# Patient Record
Sex: FEMALE | Race: WHITE | Employment: OTHER | URBAN - METROPOLITAN AREA
[De-identification: names, ages, dates, MRNs, and addresses within clinical notes are randomized per-mention and may not be internally consistent; named-entity substitution may affect disease eponyms.]

---

## 2017-01-04 PROBLEM — M54.2 CHRONIC NECK PAIN: Status: ACTIVE | Noted: 2017-01-04

## 2017-01-04 PROBLEM — G89.29 CHRONIC NECK PAIN: Status: ACTIVE | Noted: 2017-01-04

## 2018-08-28 ENCOUNTER — HOSPITAL ENCOUNTER (OUTPATIENT)
Dept: INTERVENTIONAL RADIOLOGY/VASCULAR | Age: 63
Discharge: HOME | End: 2018-08-30
Payer: COMMERCIAL

## 2018-08-28 DIAGNOSIS — I99.8 ISCHEMIA: ICD-10-CM

## 2018-08-28 PROCEDURE — 93923 UPR/LXTR ART STDY 3+ LVLS: CPT

## 2019-02-19 ENCOUNTER — HOSPITAL ENCOUNTER (OUTPATIENT)
Dept: GENERAL RADIOLOGY | Age: 64
Discharge: HOME | End: 2019-02-21
Payer: COMMERCIAL

## 2019-02-19 ENCOUNTER — HOSPITAL ENCOUNTER (OUTPATIENT)
Age: 64
Discharge: HOME | End: 2019-02-21
Payer: COMMERCIAL

## 2019-02-19 DIAGNOSIS — G89.29 CHRONIC LEFT-SIDED LOW BACK PAIN WITH SCIATICA, SCIATICA LATERALITY UNSPECIFIED: ICD-10-CM

## 2019-02-19 DIAGNOSIS — G89.29 CHRONIC BILATERAL LOW BACK PAIN WITHOUT SCIATICA: ICD-10-CM

## 2019-02-19 DIAGNOSIS — M54.50 CHRONIC BILATERAL LOW BACK PAIN WITHOUT SCIATICA: ICD-10-CM

## 2019-02-19 DIAGNOSIS — M54.40 CHRONIC LEFT-SIDED LOW BACK PAIN WITH SCIATICA, SCIATICA LATERALITY UNSPECIFIED: ICD-10-CM

## 2019-02-19 PROCEDURE — 72100 X-RAY EXAM L-S SPINE 2/3 VWS: CPT

## 2019-02-19 PROCEDURE — 72040 X-RAY EXAM NECK SPINE 2-3 VW: CPT

## 2019-02-19 PROCEDURE — 73030 X-RAY EXAM OF SHOULDER: CPT

## 2019-02-19 PROCEDURE — 72070 X-RAY EXAM THORAC SPINE 2VWS: CPT

## 2019-02-22 ENCOUNTER — OFFICE VISIT (OUTPATIENT)
Dept: PHYSICAL MEDICINE AND REHAB | Age: 64
End: 2019-02-22
Payer: COMMERCIAL

## 2019-02-22 VITALS — HEIGHT: 65 IN | BODY MASS INDEX: 18.33 KG/M2 | WEIGHT: 110 LBS

## 2019-02-22 DIAGNOSIS — R20.0 NUMBNESS OF RIGHT FOOT: ICD-10-CM

## 2019-02-22 DIAGNOSIS — M54.16 LUMBAR BACK PAIN WITH RADICULOPATHY AFFECTING RIGHT LOWER EXTREMITY: Primary | ICD-10-CM

## 2019-02-22 PROCEDURE — 95886 MUSC TEST DONE W/N TEST COMP: CPT | Performed by: PHYSICAL MEDICINE & REHABILITATION

## 2019-02-22 PROCEDURE — G8484 FLU IMMUNIZE NO ADMIN: HCPCS | Performed by: PHYSICAL MEDICINE & REHABILITATION

## 2019-02-22 PROCEDURE — 3017F COLORECTAL CA SCREEN DOC REV: CPT | Performed by: PHYSICAL MEDICINE & REHABILITATION

## 2019-02-22 PROCEDURE — G8427 DOCREV CUR MEDS BY ELIG CLIN: HCPCS | Performed by: PHYSICAL MEDICINE & REHABILITATION

## 2019-02-22 PROCEDURE — 99202 OFFICE O/P NEW SF 15 MIN: CPT | Performed by: PHYSICAL MEDICINE & REHABILITATION

## 2019-02-22 PROCEDURE — G8419 CALC BMI OUT NRM PARAM NOF/U: HCPCS | Performed by: PHYSICAL MEDICINE & REHABILITATION

## 2019-02-22 PROCEDURE — 95909 NRV CNDJ TST 5-6 STUDIES: CPT | Performed by: PHYSICAL MEDICINE & REHABILITATION

## 2019-02-22 PROCEDURE — 1036F TOBACCO NON-USER: CPT | Performed by: PHYSICAL MEDICINE & REHABILITATION

## 2019-02-25 DIAGNOSIS — M54.16 LUMBAR BACK PAIN WITH RADICULOPATHY AFFECTING RIGHT LOWER EXTREMITY: ICD-10-CM

## 2019-02-25 DIAGNOSIS — R20.0 NUMBNESS OF RIGHT FOOT: ICD-10-CM

## 2019-11-29 ENCOUNTER — HOSPITAL ENCOUNTER (OUTPATIENT)
Age: 64
Discharge: HOME | End: 2019-11-29
Payer: COMMERCIAL

## 2019-11-29 ENCOUNTER — HOSPITAL ENCOUNTER (OUTPATIENT)
Dept: MAMMOGRAPHY | Age: 64
Discharge: HOME | End: 2019-12-01
Payer: COMMERCIAL

## 2019-11-29 DIAGNOSIS — E78.2 MODERATE MIXED HYPERLIPIDEMIA NOT REQUIRING STATIN THERAPY: ICD-10-CM

## 2019-11-29 DIAGNOSIS — D50.8 IRON DEFICIENCY ANEMIA SECONDARY TO INADEQUATE DIETARY IRON INTAKE: ICD-10-CM

## 2019-11-29 DIAGNOSIS — E11.9 TYPE 2 DIABETES MELLITUS WITHOUT COMPLICATION, WITHOUT LONG-TERM CURRENT USE OF INSULIN (HCC): ICD-10-CM

## 2019-11-29 DIAGNOSIS — E03.8 HYPOTHYROIDISM DUE TO HASHIMOTO'S THYROIDITIS: ICD-10-CM

## 2019-11-29 DIAGNOSIS — Z12.31 SCREENING MAMMOGRAM, ENCOUNTER FOR: ICD-10-CM

## 2019-11-29 DIAGNOSIS — E55.9 VITAMIN D DEFICIENCY: ICD-10-CM

## 2019-11-29 DIAGNOSIS — E06.3 HYPOTHYROIDISM DUE TO HASHIMOTO'S THYROIDITIS: ICD-10-CM

## 2019-11-29 LAB
ALBUMIN SERPL-MCNC: 5 G/DL (ref 3.5–5.2)
ALP BLD-CCNC: 76 U/L (ref 35–104)
ALT SERPL-CCNC: 9 U/L (ref 0–32)
ANION GAP SERPL CALCULATED.3IONS-SCNC: 14 MMOL/L (ref 7–16)
AST SERPL-CCNC: 13 U/L (ref 0–31)
BASOPHILS ABSOLUTE: 0.04 E9/L (ref 0–0.2)
BASOPHILS RELATIVE PERCENT: 0.9 % (ref 0–2)
BILIRUB SERPL-MCNC: 0.4 MG/DL (ref 0–1.2)
BUN BLDV-MCNC: 11 MG/DL (ref 8–23)
CALCIUM SERPL-MCNC: 9.7 MG/DL (ref 8.6–10.2)
CHLORIDE BLD-SCNC: 101 MMOL/L (ref 98–107)
CHOLESTEROL, TOTAL: 254 MG/DL (ref 0–199)
CO2: 25 MMOL/L (ref 22–29)
CREAT SERPL-MCNC: 0.8 MG/DL (ref 0.5–1)
EOSINOPHILS ABSOLUTE: 0.13 E9/L (ref 0.05–0.5)
EOSINOPHILS RELATIVE PERCENT: 3 % (ref 0–6)
FERRITIN: 176 NG/ML
GFR AFRICAN AMERICAN: >60
GFR NON-AFRICAN AMERICAN: >60 ML/MIN/1.73
GLUCOSE BLD-MCNC: 99 MG/DL (ref 74–99)
HBA1C MFR BLD: 5.1 % (ref 4–5.6)
HCT VFR BLD CALC: 49.6 % (ref 34–48)
HDLC SERPL-MCNC: 64 MG/DL
HEMOGLOBIN: 16 G/DL (ref 11.5–15.5)
IMMATURE GRANULOCYTES #: 0.01 E9/L
IMMATURE GRANULOCYTES %: 0.2 % (ref 0–5)
IRON SATURATION: 44 % (ref 15–50)
IRON: 126 MCG/DL (ref 37–145)
LDL CHOLESTEROL CALCULATED: 165 MG/DL (ref 0–99)
LYMPHOCYTES ABSOLUTE: 2.38 E9/L (ref 1.5–4)
LYMPHOCYTES RELATIVE PERCENT: 55.3 % (ref 20–42)
MCH RBC QN AUTO: 30.2 PG (ref 26–35)
MCHC RBC AUTO-ENTMCNC: 32.3 % (ref 32–34.5)
MCV RBC AUTO: 93.6 FL (ref 80–99.9)
MONOCYTES ABSOLUTE: 0.36 E9/L (ref 0.1–0.95)
MONOCYTES RELATIVE PERCENT: 8.4 % (ref 2–12)
NEUTROPHILS ABSOLUTE: 1.38 E9/L (ref 1.8–7.3)
NEUTROPHILS RELATIVE PERCENT: 32.2 % (ref 43–80)
PDW BLD-RTO: 11.9 FL (ref 11.5–15)
PLATELET # BLD: 168 E9/L (ref 130–450)
PMV BLD AUTO: 10.1 FL (ref 7–12)
POTASSIUM SERPL-SCNC: 4.6 MMOL/L (ref 3.5–5)
RBC # BLD: 5.3 E12/L (ref 3.5–5.5)
SODIUM BLD-SCNC: 140 MMOL/L (ref 132–146)
TOTAL IRON BINDING CAPACITY: 288 MCG/DL (ref 250–450)
TOTAL PROTEIN: 8 G/DL (ref 6.4–8.3)
TRIGL SERPL-MCNC: 123 MG/DL (ref 0–149)
TSH SERPL DL<=0.05 MIU/L-ACNC: 2.93 UIU/ML (ref 0.27–4.2)
VITAMIN D 25-HYDROXY: 18 NG/ML (ref 30–100)
VLDLC SERPL CALC-MCNC: 25 MG/DL
WBC # BLD: 4.3 E9/L (ref 4.5–11.5)

## 2019-11-29 PROCEDURE — 85025 COMPLETE CBC W/AUTO DIFF WBC: CPT

## 2019-11-29 PROCEDURE — 83540 ASSAY OF IRON: CPT

## 2019-11-29 PROCEDURE — 83036 HEMOGLOBIN GLYCOSYLATED A1C: CPT

## 2019-11-29 PROCEDURE — 80053 COMPREHEN METABOLIC PANEL: CPT

## 2019-11-29 PROCEDURE — 77063 BREAST TOMOSYNTHESIS BI: CPT

## 2019-11-29 PROCEDURE — 80061 LIPID PANEL: CPT

## 2019-11-29 PROCEDURE — 36415 COLL VENOUS BLD VENIPUNCTURE: CPT

## 2019-11-29 PROCEDURE — 84443 ASSAY THYROID STIM HORMONE: CPT

## 2019-11-29 PROCEDURE — 82728 ASSAY OF FERRITIN: CPT

## 2019-11-29 PROCEDURE — 83550 IRON BINDING TEST: CPT

## 2019-11-29 PROCEDURE — 82306 VITAMIN D 25 HYDROXY: CPT

## 2020-11-02 ENCOUNTER — HOSPITAL ENCOUNTER (OUTPATIENT)
Dept: MRI IMAGING | Age: 65
Discharge: HOME | End: 2020-11-04
Payer: MEDICARE

## 2020-11-02 PROCEDURE — 72148 MRI LUMBAR SPINE W/O DYE: CPT

## 2020-11-02 PROCEDURE — 72141 MRI NECK SPINE W/O DYE: CPT

## 2021-02-16 ENCOUNTER — OFFICE VISIT (OUTPATIENT)
Dept: PAIN MANAGEMENT | Age: 66
End: 2021-02-16
Payer: MEDICARE

## 2021-02-16 VITALS
HEART RATE: 77 BPM | SYSTOLIC BLOOD PRESSURE: 100 MMHG | HEIGHT: 65 IN | OXYGEN SATURATION: 99 % | TEMPERATURE: 97.1 F | DIASTOLIC BLOOD PRESSURE: 70 MMHG | RESPIRATION RATE: 18 BRPM | WEIGHT: 127 LBS | BODY MASS INDEX: 21.16 KG/M2

## 2021-02-16 DIAGNOSIS — M47.816 LUMBAR FACET ARTHROPATHY: ICD-10-CM

## 2021-02-16 DIAGNOSIS — M47.816 LUMBAR SPONDYLOSIS: Primary | ICD-10-CM

## 2021-02-16 DIAGNOSIS — M47.812 CERVICAL SPONDYLOSIS: ICD-10-CM

## 2021-02-16 DIAGNOSIS — M51.9 LUMBAR DISC DISORDER: ICD-10-CM

## 2021-02-16 DIAGNOSIS — M47.812 CERVICAL FACET JOINT SYNDROME: ICD-10-CM

## 2021-02-16 DIAGNOSIS — G89.4 CHRONIC PAIN SYNDROME: ICD-10-CM

## 2021-02-16 PROCEDURE — G8427 DOCREV CUR MEDS BY ELIG CLIN: HCPCS | Performed by: PAIN MEDICINE

## 2021-02-16 PROCEDURE — 99204 OFFICE O/P NEW MOD 45 MIN: CPT | Performed by: PAIN MEDICINE

## 2021-02-16 PROCEDURE — 4040F PNEUMOC VAC/ADMIN/RCVD: CPT | Performed by: PAIN MEDICINE

## 2021-02-16 PROCEDURE — 1123F ACP DISCUSS/DSCN MKR DOCD: CPT | Performed by: PAIN MEDICINE

## 2021-02-16 PROCEDURE — 4004F PT TOBACCO SCREEN RCVD TLK: CPT | Performed by: PAIN MEDICINE

## 2021-02-16 PROCEDURE — 99214 OFFICE O/P EST MOD 30 MIN: CPT

## 2021-02-16 PROCEDURE — 1090F PRES/ABSN URINE INCON ASSESS: CPT | Performed by: PAIN MEDICINE

## 2021-02-16 PROCEDURE — G8484 FLU IMMUNIZE NO ADMIN: HCPCS | Performed by: PAIN MEDICINE

## 2021-02-16 PROCEDURE — G8420 CALC BMI NORM PARAMETERS: HCPCS | Performed by: PAIN MEDICINE

## 2021-02-16 PROCEDURE — 3017F COLORECTAL CA SCREEN DOC REV: CPT | Performed by: PAIN MEDICINE

## 2021-02-16 PROCEDURE — G8400 PT W/DXA NO RESULTS DOC: HCPCS | Performed by: PAIN MEDICINE

## 2021-02-16 RX ORDER — DIAZEPAM 10 MG/1
10 TABLET ORAL 3 TIMES DAILY
COMMUNITY

## 2021-02-16 RX ORDER — GABAPENTIN 300 MG/1
300 CAPSULE ORAL 3 TIMES DAILY
COMMUNITY

## 2021-02-16 RX ORDER — MULTIVIT WITH MINERALS/LUTEIN
1000 TABLET ORAL DAILY
COMMUNITY

## 2021-02-16 NOTE — PROGRESS NOTES
[facility]        [address], [address] [facility]      [phone]          Consult Note      Patient:  VANNESA Cazares 1955    Date of Service:  21    Requesting Physician:  Clyde Topete DO    Reason for Consult:      Patient presents with complaints of neck and low back pain that started a long time ago and has been stable. HISTORY OF PRESENT ILLNESS:      Pain does radiate to right shoulder. She  has numbness, tingling of the right shoulder and arm and does not have bladder or bowel dysfunction. She has not been on anticoagulation medications to include none. The patient  has not been on herbal supplements. The patient is not diabetic. Imaging:   Cervical spine MRI 2020:  Mild multilevel degenerative change with mild canal stenosis at C4-5    Lumbar spine MRI 2020:    1.  Minimal edematous degenerative endplate changes are seen along the right   aspect of superior endplate of L4.       2.  No fracture or malalignment.       3.  Mild right neural foraminal narrowing seen at L5/S1. Previous treatments: Physical Therapy, Nerve block, Epidural Steroid Injection and medications. .  Patient had seen  and Rambo Harrington in the past. Patient was on [address] [place] [address],[address] 10[address] TID    Opioid Agreement:  Date enacted:N/A  Renewal date:N/A    Past Medical History:   Diagnosis Date    Anxiety     Back pain     Bronchitis     Cough     Degenerative disc disease, cervical 2016    Fever chills     Headache     Neck pain     Raynaud's syndrome     Shoulder pain     Weakness - general     Wears glasses     Weight gain      Past Surgical History:   Procedure Laterality Date     SECTION  1985    FOOT SURGERY      right bunions   \  Prior to Admission medications    Medication Sig Start Date End Date Taking? Authorizing Provider   gabapentin (NEURONTIN) 300 MG capsule Take 300 mg by mouth 3 times daily.    Yes Historical Provider, MD   diazePAM (VALIUM) 10 MG tablet Take 10 mg by mouth 3 times daily. Yes Historical Provider, MD   Ascorbic Acid (VITAMIN C) 1000 MG tablet Take 1,000 mg by mouth daily   Yes Historical Provider, MD   Multiple Vitamin (MULTIVITAMIN ADULT PO) Take by mouth   Yes Historical Provider, MD   nicotine (NICODERM CQ) 21 MG/24HR Place 1 patch onto the skin daily 5/28/19 9/3/19  Pastora Gallardo MD   butalbital-acetaminophen-caffeine (FIORICET, [facility]) -01 MG per tablet Take 1 tablet by mouth every 6 hours as needed for Headaches 5/28/19 9/3/19  Pastora Gallardo MD   sertraline (ZOLOFT) 50 MG tablet Take 1 tablet by mouth daily  Patient taking differently: Take 150 mg by mouth daily  4/29/19   Pastora Gallardo MD     Allergies   Allergen Reactions    Penicillins Hives     Social History     Socioeconomic History    Marital status:      Spouse name: Not on file    Number of children: Not on file    Years of education: Not on file    Highest education level: Not on file   Occupational History    Not on file   Social Needs    Financial resource strain: Not on file    Food insecurity     Worry: Not on file     Inability: Not on file    Transportation needs     Medical: Not on file     Non-medical: Not on file   Tobacco Use    Smoking status: Current Every Day Smoker     Packs/day: 1.00     Years: 40.00     Pack years: 40.00    Smokeless tobacco: Never Used    Tobacco comment: she is on the patch   Substance and Sexual Activity    Alcohol use:  Yes     Alcohol/week: 1.0 standard drinks     Types: 1 Glasses of wine per week     Comment: 3 drinks per month whiskey    Drug use: Yes     Types: Marijuana     Comment: 2 times per day    Sexual activity: Not on file   Lifestyle    Physical activity     Days per week: Not on file     Minutes per session: Not on file    Stress: Not on file   Relationships    Social connections     Talks on phone: Not on file     Gets together: Not on file Attends Judaism service: Not on file     Active member of club or organization: Not on file     Attends meetings of clubs or organizations: Not on file     Relationship status: Not on file    Intimate partner violence     Fear of current or ex partner: Not on file     Emotionally abused: Not on file     Physically abused: Not on file     Forced sexual activity: Not on file   Other Topics Concern    Not on file   Social History Narrative    Not on file     Family History   Problem Relation Age of Onset    Diabetes Sister     Hypertension Sister     Diabetes Brother     Hypertension Brother      REVIEW OF SYSTEMS:     Patient specifically denies fever/chills, chest pain, shortness of breath, new bowel or bladder complaints. All other review of systems was negative. PHYSICAL EXAMINATION:      /70   Pulse 77   Temp 97.1 °F (36.2 °C)   Resp 18   Ht 5' 5\" (1.651 m)   Wt 127 lb (57.6 kg)   SpO2 99%   BMI 21.13 kg/m²     General:      General appearance:   pleasant and well-hydrated. , in mild discomfort and A & O x3  Build:Normal Weight    HEENT:    Head:normocephalic and atraumatic  Sclera: icterus absent,     Lungs:    Breathing:Breathing Pattern: regular, no distress    Abdomen:    Shape:non-distended and normal  Tenderness:none    Cervical spine:    Inspection:normal  Palpation:tenderness paravertebral muscles, facet loading, left, right, positive and tenderness. Range of motion:normal mildly flexion, extension rotation bilateral and is  painful. Lumbar spine:    Spine inspection:normal   CVA tenderness:No   Palpation:facet loading positive bilateral  Range of motion:normal not Lateral bending, flexion, extension rotation bilateral and is not painful.     Musculoskeletal:    Trigger points in Paraveteral:absent bilaterally  Krishnan's:negative right, negative left   SI joint tenderness:negative right, negative left              ROLAND test:negative right, negative left  Piriformis tenderness:negative right, negative left  Trochanteric bursa tenderness:negative right, negative left  SLR:negative right, negative left, sitting     Extremities:    Tremors:None bilaterally upper and lower  Range of motion:Generally normal shoulders, pain with internal rotation of hips negative. Intact:Yes  Edema:Normal    Neurological:    Sensory:normal to light touch bilateral upper and lower extremities. Motor:              Right Bicep5/5           Left Bicep5/5              Right Triceps5/5       Left Triceps5/5          Right Deltoid5/5     Left Deltoid5/5                  Right Quadriceps5/5          Left Quadriceps5/5           Right Gastrocnemius5/5    Left Gastrocnemius5/5  Right Ant Tibialis5/5  Left Ant Tibialis5/5  Reflexes:    Right Brachioradialis reflex2+  Left Brachioradialis reflex2+  Right Biceps reflex2+  Left Biceps reflex2+  Right Triceps reflex2+  Left Triceps reflex2+  Right Quadriceps reflex2+  Left Quadriceps reflex2+  Right Achilles reflex2+  Left Achilles reflex2+  Gait:normal    Dermatology:    Skin:no unusual rashes and no skin lesions    Impression:  Chronic neck and low back pain. Cervical and lumbar spine very mild degenerative changes at C5-6 and L4. Patient was a former patient of [facility] and had multiple interventional procedures which per patient didn't help. Patient was a former patient of  and was on [address] [facility] T[address]. Plan:  Chronic axial neck and low back pain. Reviewed cervical and lumbar spine MRI and discussed with the patient. Discussed treatment options with the patient including referral to physical therapy and possible facet MBB for her neck and low back pain. Advised patient that I cannot justify chronic opioid therapy. Cancel urine screen. OARRS report reviewed 02/2021. Patient encouraged to stay active. Treatment plan discussed with the patient.     We discussed with the patient that combining opioids, benzodiazepines, alcohol, illicit drugs or sleep aids increases the risk of respiratory depression including death. We discussed that these medications may cause drowsiness, sedation or dizziness and have counseled the patient not to drive or operate machinery. We have discussed that these medications will be prescribed only by one provider. We have discussed with the patient about age related risk factors and have thoroughly discussed the importance of taking these medications as prescribed. The patient verbalizes understanding. manuel Selby M.D.

## 2021-02-16 NOTE — PROGRESS NOTES
Do you currently have any of the following:    Fever: No  Headache:  No  Cough: No  Shortness of breath: No  Exposed to anyone with these symptoms: No                                                                                                                VISHAL Cochran presents to the [facility] on 2/16/2021. Kayley Grewal is complaining of pain posterior neck radiates to right shoulder , low back and radiates occasionally to right groin and inner right thigh. The pain is intermittent. The pain is described as tender and numb. Pain is rated on her best day at a 4, on her worst day at a 7, and on average at a 5 on the VAS scale. She took her last dose of Tylenol 2-14-21. Kayley Grewal does not have issues with constipation. Any procedures since your last visit: No    She is not on NSAIDS and  is not on anticoagulation medications to include none . Pacemaker or defibrillator: No .    Medication Contract and Consent for Opioid Use Documents Filed     Patient Documents       Type of Document Status Date Received Received By Description     Medication Contract [Status Missing]  Arby Monday rx agreement. bpb     Medication Contract Received 1/17/2018  3:26 PM CRYSTAL MACIAS pain contract 2018     Medication Contract Received 2/4/2019  3:21 PM CRYSTAL MACIAS MARX 2019                   /70   Pulse 77   Temp 97.1 °F (36.2 °C)   Resp 18   Ht 5' 5\" (1.651 m)   Wt 127 lb (57.6 kg)   SpO2 99%   BMI 21.13 kg/m²      No LMP recorded.  Patient is postmenopausal.

## 2022-11-14 ENCOUNTER — HOSPITAL ENCOUNTER (OUTPATIENT)
Dept: GENERAL RADIOLOGY | Age: 67
Discharge: HOME | End: 2022-11-16
Payer: MEDICARE

## 2022-11-14 ENCOUNTER — HOSPITAL ENCOUNTER (OUTPATIENT)
Age: 67
Discharge: HOME | End: 2022-11-16
Payer: MEDICARE

## 2022-11-14 DIAGNOSIS — R07.81 RIB PAIN ON RIGHT SIDE: ICD-10-CM

## 2022-11-14 PROCEDURE — 71046 X-RAY EXAM CHEST 2 VIEWS: CPT

## 2025-06-23 ENCOUNTER — HOSPITAL ENCOUNTER (EMERGENCY)
Age: 70
Discharge: HOME | End: 2025-06-23
Payer: MEDICARE

## 2025-06-23 ENCOUNTER — APPOINTMENT (OUTPATIENT)
Dept: CT IMAGING | Age: 70
End: 2025-06-23
Payer: MEDICARE

## 2025-06-23 VITALS
TEMPERATURE: 97 F | OXYGEN SATURATION: 97 % | SYSTOLIC BLOOD PRESSURE: 152 MMHG | DIASTOLIC BLOOD PRESSURE: 66 MMHG | HEART RATE: 66 BPM | RESPIRATION RATE: 16 BRPM

## 2025-06-23 DIAGNOSIS — R21 RASH AND OTHER NONSPECIFIC SKIN ERUPTION: Primary | ICD-10-CM

## 2025-06-23 DIAGNOSIS — L01.00 IMPETIGO: ICD-10-CM

## 2025-06-23 LAB
ALBUMIN SERPL-MCNC: 4.2 G/DL (ref 3.5–5.2)
ALP SERPL-CCNC: 79 U/L (ref 35–104)
ALT SERPL-CCNC: 8 U/L (ref 0–35)
ANION GAP SERPL CALCULATED.3IONS-SCNC: 10 MMOL/L (ref 7–16)
AST SERPL-CCNC: 15 U/L (ref 0–35)
BASOPHILS # BLD: 0.02 K/UL (ref 0–0.2)
BASOPHILS NFR BLD: 0 % (ref 0–2)
BILIRUB SERPL-MCNC: 0.2 MG/DL (ref 0–1.2)
BUN SERPL-MCNC: 14 MG/DL (ref 8–23)
CALCIUM SERPL-MCNC: 9.5 MG/DL (ref 8.8–10.2)
CHLORIDE SERPL-SCNC: 105 MMOL/L (ref 98–107)
CO2 SERPL-SCNC: 23 MMOL/L (ref 22–29)
CREAT SERPL-MCNC: 0.7 MG/DL (ref 0.5–1)
EKG ATRIAL RATE: 64 BPM
EKG P AXIS: 74 DEGREES
EKG P-R INTERVAL: 134 MS
EKG Q-T INTERVAL: 402 MS
EKG QRS DURATION: 78 MS
EKG QTC CALCULATION (BAZETT): 414 MS
EKG R AXIS: 71 DEGREES
EKG T AXIS: 47 DEGREES
EKG VENTRICULAR RATE: 64 BPM
EOSINOPHIL # BLD: 0.29 K/UL (ref 0.05–0.5)
EOSINOPHILS RELATIVE PERCENT: 6 % (ref 0–6)
ERYTHROCYTE [DISTWIDTH] IN BLOOD BY AUTOMATED COUNT: 12.7 % (ref 11.5–15)
GFR, ESTIMATED: 88 ML/MIN/1.73M2
GLUCOSE SERPL-MCNC: 104 MG/DL (ref 74–99)
HCT VFR BLD AUTO: 45.8 % (ref 34–48)
HGB BLD-MCNC: 15.1 G/DL (ref 11.5–15.5)
IMM GRANULOCYTES # BLD AUTO: <0.03 K/UL (ref 0–0.58)
IMM GRANULOCYTES NFR BLD: 0 % (ref 0–5)
LYMPHOCYTES NFR BLD: 1.33 K/UL (ref 1.5–4)
LYMPHOCYTES RELATIVE PERCENT: 26 % (ref 20–42)
MCH RBC QN AUTO: 30 PG (ref 26–35)
MCHC RBC AUTO-ENTMCNC: 33 G/DL (ref 32–34.5)
MCV RBC AUTO: 90.9 FL (ref 80–99.9)
MONOCYTES NFR BLD: 0.37 K/UL (ref 0.1–0.95)
MONOCYTES NFR BLD: 7 % (ref 2–12)
NEUTROPHILS NFR BLD: 61 % (ref 43–80)
NEUTS SEG NFR BLD: 3.13 K/UL (ref 1.8–7.3)
PLATELET # BLD AUTO: 192 K/UL (ref 130–450)
PMV BLD AUTO: 9.5 FL (ref 7–12)
POTASSIUM SERPL-SCNC: 4.6 MMOL/L (ref 3.5–5.1)
PROT SERPL-MCNC: 7.3 G/DL (ref 6.4–8.3)
RBC # BLD AUTO: 5.04 M/UL (ref 3.5–5.5)
SODIUM SERPL-SCNC: 137 MMOL/L (ref 136–145)
WBC OTHER # BLD: 5.2 K/UL (ref 4.5–11.5)

## 2025-06-23 PROCEDURE — 99285 EMERGENCY DEPT VISIT HI MDM: CPT

## 2025-06-23 PROCEDURE — 96374 THER/PROPH/DIAG INJ IV PUSH: CPT

## 2025-06-23 PROCEDURE — 70498 CT ANGIOGRAPHY NECK: CPT

## 2025-06-23 PROCEDURE — 93005 ELECTROCARDIOGRAM TRACING: CPT | Performed by: NURSE PRACTITIONER

## 2025-06-23 PROCEDURE — 70496 CT ANGIOGRAPHY HEAD: CPT

## 2025-06-23 PROCEDURE — 6370000000 HC RX 637 (ALT 250 FOR IP): Performed by: NURSE PRACTITIONER

## 2025-06-23 PROCEDURE — 93010 ELECTROCARDIOGRAM REPORT: CPT | Performed by: INTERNAL MEDICINE

## 2025-06-23 PROCEDURE — 80053 COMPREHEN METABOLIC PANEL: CPT

## 2025-06-23 PROCEDURE — 6360000002 HC RX W HCPCS: Performed by: NURSE PRACTITIONER

## 2025-06-23 PROCEDURE — 6360000004 HC RX CONTRAST MEDICATION: Performed by: RADIOLOGY

## 2025-06-23 PROCEDURE — 85025 COMPLETE CBC W/AUTO DIFF WBC: CPT

## 2025-06-23 PROCEDURE — 2500000003 HC RX 250 WO HCPCS: Performed by: NURSE PRACTITIONER

## 2025-06-23 RX ORDER — METHYLPREDNISOLONE 4 MG/1
TABLET ORAL
Qty: 21 TABLET | Refills: 0 | Status: SHIPPED | OUTPATIENT
Start: 2025-06-23 | End: 2025-06-29

## 2025-06-23 RX ORDER — CETIRIZINE HYDROCHLORIDE 10 MG/1
10 TABLET ORAL ONCE
Status: COMPLETED | OUTPATIENT
Start: 2025-06-23 | End: 2025-06-23

## 2025-06-23 RX ORDER — DOXYCYCLINE HYCLATE 100 MG
100 TABLET ORAL 2 TIMES DAILY
Qty: 20 TABLET | Refills: 0 | Status: SHIPPED | OUTPATIENT
Start: 2025-06-23 | End: 2025-07-03

## 2025-06-23 RX ORDER — CETIRIZINE HYDROCHLORIDE 10 MG/1
10 TABLET ORAL 2 TIMES DAILY
Qty: 20 TABLET | Refills: 0 | Status: SHIPPED | OUTPATIENT
Start: 2025-06-23 | End: 2025-07-03

## 2025-06-23 RX ORDER — MUPIROCIN CALCIUM 20 MG/G
CREAM TOPICAL
Qty: 30 G | Refills: 0 | Status: SHIPPED | OUTPATIENT
Start: 2025-06-23 | End: 2025-07-23

## 2025-06-23 RX ORDER — FAMOTIDINE 20 MG/1
20 TABLET, FILM COATED ORAL 2 TIMES DAILY
Qty: 20 TABLET | Refills: 0 | Status: SHIPPED | OUTPATIENT
Start: 2025-06-23 | End: 2025-07-03

## 2025-06-23 RX ORDER — IOPAMIDOL 755 MG/ML
70 INJECTION, SOLUTION INTRAVASCULAR
Status: COMPLETED | OUTPATIENT
Start: 2025-06-23 | End: 2025-06-23

## 2025-06-23 RX ADMIN — CETIRIZINE HYDROCHLORIDE 10 MG: 10 TABLET, FILM COATED ORAL at 10:24

## 2025-06-23 RX ADMIN — IOPAMIDOL 70 ML: 755 INJECTION, SOLUTION INTRAVENOUS at 11:48

## 2025-06-23 RX ADMIN — METHYLPREDNISOLONE SODIUM SUCCINATE 125 MG: 125 INJECTION INTRAMUSCULAR; INTRAVENOUS at 10:17

## 2025-06-23 NOTE — ED PROVIDER NOTES
SECTION  1985    FOOT SURGERY      right bunions       CURRENTMEDICATIONS       Discharge Medication List as of 2025  1:34 PM        CONTINUE these medications which have NOT CHANGED    Details   gabapentin (NEURONTIN) 300 MG capsule Take 300 mg by mouth 3 times daily.Historical Med      diazePAM (VALIUM) 10 MG tablet Take 10 mg by mouth 3 times daily.Historical Med      Ascorbic Acid (VITAMIN C) 1000 MG tablet Take 1,000 mg by mouth dailyHistorical Med      Multiple Vitamin (MULTIVITAMIN ADULT PO) Take by mouthHistorical Med      nicotine (NICODERM CQ) 21 MG/24HR Place 1 patch onto the skin daily, Disp-42 patch, R-0Normal      butalbital-acetaminophen-caffeine (FIORICET, ESGIC) -40 MG per tablet Take 1 tablet by mouth every 6 hours as needed for Headaches, Disp-120 tablet, R-5Normal      sertraline (ZOLOFT) 50 MG tablet Take 1 tablet by mouth daily, Disp-30 tablet, R-5Normal             ALLERGIES     Penicillins    FAMILYHISTORY       Family History   Problem Relation Age of Onset    Diabetes Sister     Hypertension Sister     Diabetes Brother     Hypertension Brother         SOCIAL HISTORY       Social History     Tobacco Use    Smoking status: Every Day     Current packs/day: 1.00     Average packs/day: 1 pack/day for 40.0 years (40.0 ttl pk-yrs)     Types: Cigarettes    Smokeless tobacco: Never    Tobacco comments:     she is on the patch   Substance Use Topics    Alcohol use: Yes     Alcohol/week: 1.0 standard drink of alcohol     Types: 1 Glasses of wine per week     Comment: 3 drinks per month whiskey    Drug use: Yes     Types: Marijuana (Weed)     Comment: 2 times per day       SCREENINGS        Kevin Coma Scale  Eye Opening: Spontaneous  Best Verbal Response: Oriented  Best Motor Response: Obeys commands  [place] Coma Scale Score: 15                CIWA Assessment  BP: (!) 152/66  Pulse: 66           PHYSICAL EXAM  1 or more Elements     ED Triage Vitals [25 0913]   BP